# Patient Record
Sex: MALE | Race: WHITE | NOT HISPANIC OR LATINO
[De-identification: names, ages, dates, MRNs, and addresses within clinical notes are randomized per-mention and may not be internally consistent; named-entity substitution may affect disease eponyms.]

---

## 2023-03-27 ENCOUNTER — NON-APPOINTMENT (OUTPATIENT)
Age: 63
End: 2023-03-27

## 2023-03-27 ENCOUNTER — APPOINTMENT (OUTPATIENT)
Dept: ORTHOPEDIC SURGERY | Facility: CLINIC | Age: 63
End: 2023-03-27
Payer: COMMERCIAL

## 2023-03-27 VITALS — WEIGHT: 280 LBS | BODY MASS INDEX: 42.44 KG/M2 | HEIGHT: 68 IN

## 2023-03-27 PROBLEM — Z00.00 ENCOUNTER FOR PREVENTIVE HEALTH EXAMINATION: Status: ACTIVE | Noted: 2023-03-27

## 2023-03-27 PROCEDURE — 20610 DRAIN/INJ JOINT/BURSA W/O US: CPT | Mod: RT

## 2023-03-27 PROCEDURE — 99203 OFFICE O/P NEW LOW 30 MIN: CPT | Mod: 25

## 2023-03-27 NOTE — IMAGING
[de-identified] : Right knee exam is as follows: Minimal effusion noted.  No erythema or ecchymosis.  Able to perform active straight leg raise.  Knee flexion from 0 to 110 degrees with stiffness and pain.  Patellofemoral, medial/lateral joint line tenderness to palpation.  Calf is soft and nontender.  Positive Isaias's.  Light touch intact throughout.  Mildly antalgic gait.  Ambulation with cane.\par \par Right knee x-rays taken at [facility] urgent care were reviewed in the office today on a CD that was brought in by the patient.  It revealed no obvious fractures, subluxations, or dislocations.  Medial and patellofemoral joint space narrowing with osteoarthritic changes noted.

## 2023-03-27 NOTE — HISTORY OF PRESENT ILLNESS
[de-identified] : Patient is a 62-year-old male who reports to the office for evaluation of his right knee pain that has been aggravating him for the past few weeks.  He states that he recently missed the last step which caused him and his right knee.  He went to [facility] urgent care where they performed x-rays and confirmed that the patient has no acute fractures.  Walking, range of motion, and palpating certain areas of the knee aggravate the patient's pain.  Admits the knee might buckle/give out on him occasionally.  Denies any numbness or tingling.

## 2023-03-27 NOTE — PROCEDURE
[Large Joint Injection] : Large joint injection [Right] : of the right [Knee] : knee [Pain] : pain [Alcohol] : alcohol [Ethyl Chloride sprayed topically] : ethyl chloride sprayed topically [Sterile technique used] : sterile technique used [____] : [unfilled] [] : Patient tolerated procedure well [Call if redness, pain or fever occur] : call if redness, pain or fever occur [Apply ice for 15min out of every hour for the next 12-24 hours as tolerated] : apply ice for 15 minutes out of every hour for the next 12-24 hours as tolerated [Patient had decreased mobility in the joint] : patient had decreased mobility in the joint [Risks, benefits, alternatives discussed / Verbal consent obtained] : the risks benefits, and alternatives have been discussed, and verbal consent was obtained

## 2023-03-27 NOTE — DISCUSSION/SUMMARY
[de-identified] : Mobic 7.5 mg PO QD PRN was sent to patient's pharmacy to help alleviate their symptoms.  Was advised to monitor his blood pressure while taking this medication.  The patient was advised to rest/ice the area and may alternate with warm compresses as needed.  He may continue to use a knee brace for support/stability\par \par With the patient's approval, and under sterile technique, I performed a steroid injection today.  See the attached procedure note for further details.  The patient was informed that their next cortisone injection could not be until a minimum of three months from today's date and the patient understands.  Explained to the patient that the full effect of the injection will take 3-5 days to kick in. \par \par Patient is a controlled diabetic and will monitor his glucose over the next week.  He states that his last hemoglobin A1c was 6.3%.  If his glucose becomes too elevated, he will contact his PCP for possible medication adjustment.  He understands and will comply.\par \par Patient clinically may have a meniscal tear.  Right knee MRI ordered for further evaluation.  Patient was advised to call the office a few days after getting the MRI done to discuss results over the phone.\par \par The knee conditioning program from the [facility] was given to the patient so they may try that at home.  The patient will follow-up in 4-6 weeks for further evaluation.  All of the patient's questions/concerns were answered in detail.\par \par The patient was seen under the supervision of Dr. Zepeda.

## 2023-04-14 ENCOUNTER — APPOINTMENT (OUTPATIENT)
Dept: MRI IMAGING | Facility: CLINIC | Age: 63
End: 2023-04-14
Payer: COMMERCIAL

## 2023-04-14 PROCEDURE — 73721 MRI JNT OF LWR EXTRE W/O DYE: CPT | Mod: RT

## 2023-04-26 ENCOUNTER — APPOINTMENT (OUTPATIENT)
Dept: ORTHOPEDIC SURGERY | Facility: CLINIC | Age: 63
End: 2023-04-26
Payer: COMMERCIAL

## 2023-04-26 ENCOUNTER — NON-APPOINTMENT (OUTPATIENT)
Age: 63
End: 2023-04-26

## 2023-04-26 DIAGNOSIS — S83.241D OTHER TEAR OF MEDIAL MENISCUS, CURRENT INJURY, RIGHT KNEE, SUBSEQUENT ENCOUNTER: ICD-10-CM

## 2023-04-26 PROCEDURE — 99213 OFFICE O/P EST LOW 20 MIN: CPT

## 2023-04-26 NOTE — HISTORY OF PRESENT ILLNESS
[de-identified] : Chief complaint: RT knee\par \par 62-year-old male presents with RT knee pain that has been going on since beginning of March. He comes in today with a cane. Works from home. States he stepped off a curb but missed his step and had pain two weeks after.  Walking, range of motion, and palpating certain areas of the knee aggravate the patient's pain.  Admits to knee instability and buckling, has been wearing a knee brace which improved his stability. Received a cortisone injection from [facility] during his last visit, states the injection did help for some time.  Denies any numbness or tingling. He has not been to formal physical therapy yet. States he has not tried the the home exercises that Robin provided him.\par \par H/o diabetes and hypertension. Taking metformin, atorvastatin and losartan. Denies any allergies to medication. \par \par MRI of the RT knee from 04/14/2023 shows:\par 1. Complex medial meniscal tearing, lateral meniscal degeneration, tricompartmental chondral loss, medial and patellofemoral \par joint space narrowing with mild subchondral edema, effusion, synovitis, and moderate popliteal cyst which may have ruptured.\par 2. Subcutaneous edema and swelling anteriorly and circumferentially surrounding the knee.\par 3. Chronic appearing cruciate ligament sprains and MCL laxity with extensor mechanism tendinopathy.\par 4. Patient motion and large body habitus degrades image quality on all imaging sequences.\par \par On exam minimally tender over joint line.,  Ligaments are stable, negative Homans' sign, no erythema\par \par States he is about 80% better.\par \par Right knee medial meniscal tear posterior horn\par \par Recommending he start formal physical therapy with a home program. Follow-up in 2 months with Robin, if still symptomatic discuss operative and non-operative treatments.

## 2023-06-08 ENCOUNTER — APPOINTMENT (OUTPATIENT)
Dept: ORTHOPEDIC SURGERY | Facility: CLINIC | Age: 63
End: 2023-06-08
Payer: COMMERCIAL

## 2023-06-08 DIAGNOSIS — M25.561 PAIN IN RIGHT KNEE: ICD-10-CM

## 2023-06-08 PROCEDURE — 99213 OFFICE O/P EST LOW 20 MIN: CPT

## 2023-06-08 NOTE — IMAGING
[de-identified] : Right knee exam is as follows: Minimal effusion noted.  No erythema or ecchymosis.  Able to perform active straight leg raise.  Knee flexion from 0 to 120 degrees.  Medial joint line tenderness to palpation.  Calf is soft and nontender.  Positive Isaias's.  Nonantalgic gait.

## 2023-06-08 NOTE — DISCUSSION/SUMMARY
[de-identified] : Explained medial meniscal tear.  Operative versus nonoperative treatment was discussed with patient in detail.  He would like to continue conservative management for now since his pain has been improving.  New PT prescription given to the patient so he may continue that as directed.\par \par The patient was advised to rest/ice the area and may alternate with warm compresses as needed.  Mobic 7.5 mg PO QD PRN was sent to patient's pharmacy to help alleviate their symptoms.  Was advised to monitor his blood pressure while taking this medication.\par \par The patient will follow-up in 3 months for further evaluation.  We will consider cortisone injection again or possible viscosupplementation injection if patient is still symptomatic at his next visit.  All of the patient's questions/concerns were answered in detail.\par \par

## 2023-06-08 NOTE — HISTORY OF PRESENT ILLNESS
[de-identified] : Patient is a 63-year-old male who reports to the office for subsequent reevaluation of his right knee pain.  He has been doing formal physical therapy which has been giving him relief.  He states that he is about 80% better since his initial visit.  Certain range of motion and palpating certain areas of the knee still aggravate the patient's pain at times.  Denies any numbness or tingling.

## 2023-06-13 RX ORDER — MELOXICAM 7.5 MG/1
7.5 TABLET ORAL
Qty: 30 | Refills: 1 | Status: ACTIVE | COMMUNITY
Start: 2023-03-27 | End: 1900-01-01

## 2023-09-01 ENCOUNTER — APPOINTMENT (OUTPATIENT)
Dept: ORTHOPEDIC SURGERY | Facility: CLINIC | Age: 63
End: 2023-09-01

## 2023-09-01 ENCOUNTER — NON-APPOINTMENT (OUTPATIENT)
Age: 63
End: 2023-09-01

## 2023-09-01 ENCOUNTER — APPOINTMENT (OUTPATIENT)
Dept: ORTHOPEDIC SURGERY | Facility: CLINIC | Age: 63
End: 2023-09-01
Payer: COMMERCIAL

## 2023-09-01 PROCEDURE — 99213 OFFICE O/P EST LOW 20 MIN: CPT

## 2023-09-01 NOTE — DISCUSSION/SUMMARY
[de-identified] : Right knee pain follow-up  Patient is a 63-year-old male who reports to the office for subsequent reevaluation of his right knee pain.  He has done formal physical therapy, done a cortisone injection in the past, and has taken meloxicam which has been giving him some relief.  His diabetes is currently not well controlled and he reports fasting glucose of 160-170.  Walking, up and down stairs, getting up from a seated position, flexing extending the knee all aggravate the patient's pain. Admits that the knee clicks/deena on him occasionally.  Denies any numbness or tingling  Right knee exam is as follows: Minimal effusion noted.  No erythema or ecchymosis.  Able to perform active leg raise.  Knee flexion from 0 to 120 degrees.  Mild medial/lateral joint line tenderness to palpation.  Calf is soft and nontender.  Positive Isaias's.  Light touch intact throughout.  Nonantalgic gait.  Assessment/plan Operative versus nonoperative treatment was discussed with the patient in detail.  We will continue conservative management for now.  Cortisone injection is not a good option today since patient's fasting glucose has been elevated.  Advised patient to have his diabetes under control in order for us to try another cortisone injection in the future.    He will continue formal physical therapy and the at home exercises as directed. Right knee Synvisc 1 gel injection ordered for the patient so he may receive that at his next visit.  The patient will follow-up in 6 weeks for further evaluation.  All of the patient's questions/concerns were answered in detail.

## 2023-10-13 ENCOUNTER — APPOINTMENT (OUTPATIENT)
Dept: ORTHOPEDIC SURGERY | Facility: CLINIC | Age: 63
End: 2023-10-13
Payer: COMMERCIAL

## 2023-10-13 PROCEDURE — 99213 OFFICE O/P EST LOW 20 MIN: CPT | Mod: 25

## 2023-10-13 PROCEDURE — 20610 DRAIN/INJ JOINT/BURSA W/O US: CPT | Mod: RT

## 2023-10-19 ENCOUNTER — APPOINTMENT (OUTPATIENT)
Dept: ORTHOPEDIC SURGERY | Facility: CLINIC | Age: 63
End: 2023-10-19
Payer: COMMERCIAL

## 2023-10-19 PROCEDURE — 20610 DRAIN/INJ JOINT/BURSA W/O US: CPT | Mod: RT

## 2023-10-27 ENCOUNTER — APPOINTMENT (OUTPATIENT)
Dept: ORTHOPEDIC SURGERY | Facility: CLINIC | Age: 63
End: 2023-10-27
Payer: COMMERCIAL

## 2023-10-27 PROCEDURE — 20610 DRAIN/INJ JOINT/BURSA W/O US: CPT | Mod: RT

## 2024-01-04 ENCOUNTER — APPOINTMENT (OUTPATIENT)
Dept: ORTHOPEDIC SURGERY | Facility: CLINIC | Age: 64
End: 2024-01-04
Payer: COMMERCIAL

## 2024-01-04 DIAGNOSIS — M17.11 UNILATERAL PRIMARY OSTEOARTHRITIS, RIGHT KNEE: ICD-10-CM

## 2024-01-04 DIAGNOSIS — S83.241A OTHER TEAR OF MEDIAL MENISCUS, CURRENT INJURY, RIGHT KNEE, INITIAL ENCOUNTER: ICD-10-CM

## 2024-01-04 PROCEDURE — 99213 OFFICE O/P EST LOW 20 MIN: CPT

## 2024-01-04 NOTE — DISCUSSION/SUMMARY
[de-identified] : Right knee pain follow-up  HPI Patient is a 63-year-old male who reports to the office for subsequent reevaluation of his right knee pain.  He had the Euflexxa gel injection series done in October 2023.  He has benefited from these gel injections and his pain has improved.  Certain range of motion aggravate the patient's pain at times.  Denies any numbness or tingling.  Right knee exam is as follows: No effusion noted.  No erythema or ecchymosis.  Able to perform active straight leg raise.  Knee flexion from 0 to 120 degrees.  No tenderness to palpation.  Calf is soft and nontender.  Light touch intact throughout.  Nonantalgic gait.  Assessment/plan Patient's pain has improved with Euflexxa gel injection series.  He will take Tylenol or Motrin as needed for pain.  Follow-up on as-needed basis.  All the patient's questions/concerns were answered in detail.

## 2024-01-09 ENCOUNTER — NON-APPOINTMENT (OUTPATIENT)
Age: 64
End: 2024-01-09